# Patient Record
Sex: FEMALE | Race: WHITE | NOT HISPANIC OR LATINO | Employment: FULL TIME
[De-identification: names, ages, dates, MRNs, and addresses within clinical notes are randomized per-mention and may not be internally consistent; named-entity substitution may affect disease eponyms.]

---

## 2017-10-08 ENCOUNTER — HEALTH MAINTENANCE LETTER (OUTPATIENT)
Age: 42
End: 2017-10-08

## 2017-10-12 ENCOUNTER — TRANSFERRED RECORDS (OUTPATIENT)
Dept: HEALTH INFORMATION MANAGEMENT | Facility: CLINIC | Age: 42
End: 2017-10-12

## 2018-10-23 ENCOUNTER — RADIANT APPOINTMENT (OUTPATIENT)
Dept: MAMMOGRAPHY | Facility: CLINIC | Age: 43
End: 2018-10-23
Attending: OBSTETRICS & GYNECOLOGY
Payer: COMMERCIAL

## 2018-10-23 DIAGNOSIS — Z12.31 VISIT FOR SCREENING MAMMOGRAM: ICD-10-CM

## 2018-10-23 PROCEDURE — 77067 SCR MAMMO BI INCL CAD: CPT | Performed by: STUDENT IN AN ORGANIZED HEALTH CARE EDUCATION/TRAINING PROGRAM

## 2018-10-23 PROCEDURE — 77063 BREAST TOMOSYNTHESIS BI: CPT | Performed by: STUDENT IN AN ORGANIZED HEALTH CARE EDUCATION/TRAINING PROGRAM

## 2019-03-20 ENCOUNTER — THERAPY VISIT (OUTPATIENT)
Dept: PHYSICAL THERAPY | Facility: CLINIC | Age: 44
End: 2019-03-20
Payer: COMMERCIAL

## 2019-03-20 DIAGNOSIS — M25.511 ACUTE PAIN OF RIGHT SHOULDER: Primary | ICD-10-CM

## 2019-03-20 PROCEDURE — 97110 THERAPEUTIC EXERCISES: CPT | Mod: GP | Performed by: PHYSICAL THERAPIST

## 2019-03-20 PROCEDURE — 97112 NEUROMUSCULAR REEDUCATION: CPT | Mod: GP | Performed by: PHYSICAL THERAPIST

## 2019-03-20 PROCEDURE — 97161 PT EVAL LOW COMPLEX 20 MIN: CPT | Mod: GP | Performed by: PHYSICAL THERAPIST

## 2019-03-20 NOTE — LETTER
[facility]  [address]  [place] [address]  [phone]    2019    Re: Chanelle Rolle   :   1975  MRN:  [id number]   REFERRING PHYSICIAN:   [facility]    Date of Initial Evaluation:  3/20/19  Visits:  Rxs Used: 1  Reason for Referral:  Acute pain of right shoulder    [facility] Initial Evaluation    Subjective:  SPADI 13/100   The history is provided by the patient. No  was used.   Chanelle Rolle is a 44 year old female with a right shoulder condition.  Condition occurred with:  Lifting.  Condition occurred: during recreation/sport (weight lifting at her gym. ).  This is a new condition  March 3, 2019.    Patient reports pain:  In the joint, anterior and lateral.  Radiates to:  Upper arm.  Pain is described as burning and sharp and is intermittent and reported as 8/10.  Associated symptoms:  Painful arc. Pain is worse during the day.  Symptoms are exacerbated by lifting, using arm at shoulder level and using arm overhead and relieved by ice and rest.  Since onset symptoms are gradually improving.  Special tests:  X-ray.      General health as reported by patient is excellent.  Pertinent medical history includes:  None.  Medical allergies: no.  Other surgeries include:  None reported.  Current medications:  None as reported by the patient.  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  Barriers include:  None as reported by the patient.  Red flags:  None as reported by the patient.    Objective:  Standing Alignment:    Cervical/Thoracic:  Forward head  Shoulder/UE:  Scapular winging L, scapular winging R and rounded shoulders    Gait:    Gait Type:  Normal     Flexibility/Screens:   Negative screens: Cervical   Neurological: She is alert. She has  normal strength and normal reflexes. No cranial nerve deficit or sensory deficit.     Shoulder Evaluation:  ROM:  PROM:  : R forearm lacking Supination due to forearm fracture  as a  child   Flexion:  Left:  165    Right: 165    Internal Rotation:  Left:  90    Right:  90  External Rotation:  Left:  90    Right:  70    Stability Testing:  not assessed    Special Tests:    Right shoulder positive for the following special tests:Impingement    Assessment/Plan:    Patient is a 44 year old female with right side shoulder complaints.    Patient has the following significant findings with corresponding treatment plan.                Diagnosis 1:  Shoulder Impingement       Therapy Evaluation Codes:   1) History comprised of:   Personal factors that impact the plan of care:      None.    Comorbidity factors that impact the plan of care are:      None.     Medications impacting care: None.  2) Examination of Body Systems comprised of:   Body structures and functions that impact the plan of care:      Shoulder.   Activity limitations that impact the plan of care are:      Bathing, Dressing, Lifting and Working.  3) Clinical presentation characteristics are:   Stable/Uncomplicated.  4) Decision-Making    Low complexity using standardized patient assessment instrument and/or measureable assessment of functional outcome.    Cumulative Therapy Evaluation is: Low complexity.  Previous and current functional limitations:  (See Goal Flow Sheet for this information)    Short term and Long term goals: (See Goal Flow Sheet for this information)   Communication ability:  Patient appears to be able to clearly communicate and understand verbal and written communication and follow directions correctly.  Treatment Explanation - The following has been discussed with the patient:   RX ordered/plan of care  Anticipated outcomes  Possible risks and side effects  This patient would benefit from PT intervention to resume normal activities.    Rehab potential is good.    Frequency:  1 X week, to every other week once daily  Duration:  for  Up to 6  weeks  Discharge Plan:  Achieve all LTG.  Independent in home treatment program.  Reach maximal therapeutic benefit.    Rehab Plans: initiate light weight scap strength, posture management and hold off weight room lifting up to 4-6 weeks     Thank you for your referral.    INQUIRIES  Therapist: Jim Garcia, PT, ATC, Cert. MDT  [facility]  [address]  [place] [address]  Phone: [phone]  Fax: [phone]

## 2019-03-21 NOTE — PROGRESS NOTES
[facility] Initial Evaluation  Subjective:  SPADI 13/100       The history is provided by the patient. No  was used.   Chanelle Rolle is a 44 year old female with a right shoulder condition.  Condition occurred with:  Lifting.  Condition occurred: during recreation/sport (weight lifting at her gym. ).  This is a new condition  March 3, 2019.    Patient reports pain:  In the joint, anterior and lateral.  Radiates to:  Upper arm.  Pain is described as burning and sharp and is intermittent and reported as 8/10.  Associated symptoms:  Painful arc. Pain is worse during the day.  Symptoms are exacerbated by lifting, using arm at shoulder level and using arm overhead and relieved by ice and rest.  Since onset symptoms are gradually improving.  Special tests:  X-ray.      General health as reported by patient is excellent.  Pertinent medical history includes:  None.  Medical allergies: no.  Other surgeries include:  None reported.  Current medications:  None as reported by the patient.  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    Barriers include:  None as reported by the patient.    Red flags:  None as reported by the patient.                        Objective:  Standing Alignment:    Cervical/Thoracic:  Forward head  Shoulder/UE:  Scapular winging L, scapular winging R and rounded shoulders              Gait:    Gait Type:  Normal         Flexibility/Screens:   Negative screens: Cervical           Neurological: She is alert. She has normal strength and normal reflexes. No cranial nerve deficit or sensory deficit.                      Shoulder Evaluation:  ROM:    PROM:  : R forearm lacking Supination due to forearm fracture  as a  child   Flexion:  Left:  165    Right: 165          Internal Rotation:  Left:  90    Right:   90  External Rotation:  Left:  90    Right:  70                      Stability Testing:  not assessed      Special Tests:      Right shoulder positive for the following special tests:Impingement                                       General     ROS    Assessment/Plan:    Patient is a 44 year old female with right side shoulder complaints.    Patient has the following significant findings with corresponding treatment plan.                Diagnosis 1:  Shoulder Impingement       Therapy Evaluation Codes:   1) History comprised of:   Personal factors that impact the plan of care:      None.    Comorbidity factors that impact the plan of care are:      None.     Medications impacting care: None.  2) Examination of Body Systems comprised of:   Body structures and functions that impact the plan of care:      Shoulder.   Activity limitations that impact the plan of care are:      Bathing, Dressing, Lifting and Working.  3) Clinical presentation characteristics are:   Stable/Uncomplicated.  4) Decision-Making    Low complexity using standardized patient assessment instrument and/or measureable assessment of functional outcome.  Cumulative Therapy Evaluation is: Low complexity.    Previous and current functional limitations:  (See Goal Flow Sheet for this information)    Short term and Long term goals: (See Goal Flow Sheet for this information)     Communication ability:  Patient appears to be able to clearly communicate and understand verbal and written communication and follow directions correctly.  Treatment Explanation - The following has been discussed with the patient:   RX ordered/plan of care  Anticipated outcomes  Possible risks and side effects  This patient would benefit from PT intervention to resume normal activities.   Rehab potential is good.    Frequency:  1 X week, to every other week once daily  Duration:  for  Up to 6  weeks  Discharge Plan:  Achieve all LTG.  Independent in home treatment program.  Reach  maximal therapeutic benefit.    Rehab Plans: initiate light weight scap strength, posture management and hold off weight room lifting up to 4-6 weeks     Please refer to the daily flowsheet for treatment today, total treatment time and time spent performing 1:1 timed codes.

## 2019-04-01 ENCOUNTER — THERAPY VISIT (OUTPATIENT)
Dept: PHYSICAL THERAPY | Facility: CLINIC | Age: 44
End: 2019-04-01
Payer: COMMERCIAL

## 2019-04-01 DIAGNOSIS — M25.511 ACUTE PAIN OF RIGHT SHOULDER: Primary | ICD-10-CM

## 2019-04-01 PROCEDURE — 97110 THERAPEUTIC EXERCISES: CPT | Mod: GP | Performed by: PHYSICAL THERAPIST

## 2019-04-01 PROCEDURE — 97112 NEUROMUSCULAR REEDUCATION: CPT | Mod: GP | Performed by: PHYSICAL THERAPIST

## 2019-04-26 ENCOUNTER — THERAPY VISIT (OUTPATIENT)
Dept: PHYSICAL THERAPY | Facility: CLINIC | Age: 44
End: 2019-04-26
Payer: COMMERCIAL

## 2019-04-26 DIAGNOSIS — M75.41 IMPINGEMENT SYNDROME OF SHOULDER REGION, RIGHT: Primary | ICD-10-CM

## 2019-04-26 PROCEDURE — 97110 THERAPEUTIC EXERCISES: CPT | Mod: GP | Performed by: PHYSICAL THERAPIST

## 2019-04-26 PROCEDURE — 97112 NEUROMUSCULAR REEDUCATION: CPT | Mod: GP | Performed by: PHYSICAL THERAPIST

## 2019-04-26 NOTE — LETTER
[facility]   [address] [place] [id number] [phone]    2019    Re: Chanelle Rolle   :   1975  MRN:  [id number]   REFERRING PHYSICIAN:   [facility]    Date of Initial Evaluation:  3/20/19  Visits:  Rxs Used: 3  Reason for Referral:  Impingement syndrome of shoulder region, right    PROGRESS  REPORT  Progress reporting period is from 3/20/19 to 19. 3 visits     SUBJECTIVE  Overall improved pain and function. Pain with abduction position yet at roughly 90 degrees. Cannot sleep on R side at night.    Current Pain level: 2/10   Initial Pain level: 8/10   Changes in function: Yes, see goal flow sheet for change in function   Adverse reactions: None     OBJECTIVE  Objective: Abduction painful arc, ROM 90 IR, 90 ER, overhead flexion 170.   Weak and painful ER and abduction, lessened under traction.     Cross body stretching still hurts anterior shoulder, therefore DC. Emphasis to prone scap strength, RTC work ligt weight, high reps pain free 3-4x/week.   Follow up planned in 3 weeks. SPADI at that point.       ASSESSMENT/PLAN  Updated problem list and treatment plan: Diagnosis 1:  R shoulder impingement    Assessment of Progress: The patient's condition is improving.  The patient's condition has potential to improve.    Recommendations:  This patient would benefit from continued therapy.     Frequency:  1 X week, every 2-3 weeks once daily  Duration:  for 6 weeks    Thank you for your referral.    INQUIRIES  Therapist: Jim Garcia, PT, ATC, Cert. MDT  [facility]   [address] [place] [address]  Phone: [phone]  Fax: [phone]

## 2019-05-13 ENCOUNTER — THERAPY VISIT (OUTPATIENT)
Dept: PHYSICAL THERAPY | Facility: CLINIC | Age: 44
End: 2019-05-13
Payer: COMMERCIAL

## 2019-05-13 DIAGNOSIS — M25.511 ACUTE PAIN OF RIGHT SHOULDER: Primary | ICD-10-CM

## 2019-05-13 PROCEDURE — 97140 MANUAL THERAPY 1/> REGIONS: CPT | Mod: GP | Performed by: PHYSICAL THERAPIST

## 2019-05-13 PROCEDURE — 97112 NEUROMUSCULAR REEDUCATION: CPT | Mod: GP | Performed by: PHYSICAL THERAPIST

## 2019-05-13 PROCEDURE — 97110 THERAPEUTIC EXERCISES: CPT | Mod: GP | Performed by: PHYSICAL THERAPIST

## 2019-05-29 ENCOUNTER — PRE VISIT (OUTPATIENT)
Dept: ORTHOPEDICS | Facility: CLINIC | Age: 44
End: 2019-05-29

## 2019-05-29 DIAGNOSIS — M25.511 ACUTE PAIN OF RIGHT SHOULDER: Primary | ICD-10-CM

## 2019-05-30 ENCOUNTER — ANCILLARY PROCEDURE (OUTPATIENT)
Dept: GENERAL RADIOLOGY | Facility: CLINIC | Age: 44
End: 2019-05-30
Attending: ORTHOPAEDIC SURGERY
Payer: COMMERCIAL

## 2019-05-30 ENCOUNTER — OFFICE VISIT (OUTPATIENT)
Dept: ORTHOPEDICS | Facility: CLINIC | Age: 44
End: 2019-05-30
Payer: COMMERCIAL

## 2019-05-30 VITALS
HEIGHT: 72 IN | HEART RATE: 58 BPM | OXYGEN SATURATION: 100 % | SYSTOLIC BLOOD PRESSURE: 132 MMHG | WEIGHT: 137.5 LBS | DIASTOLIC BLOOD PRESSURE: 76 MMHG | BODY MASS INDEX: 18.62 KG/M2

## 2019-05-30 DIAGNOSIS — M67.919 DISORDER OF BURSAE AND TENDONS IN SHOULDER REGION: Primary | ICD-10-CM

## 2019-05-30 DIAGNOSIS — M25.511 ACUTE PAIN OF RIGHT SHOULDER: ICD-10-CM

## 2019-05-30 DIAGNOSIS — M71.9 DISORDER OF BURSAE AND TENDONS IN SHOULDER REGION: Primary | ICD-10-CM

## 2019-05-30 PROCEDURE — 73030 X-RAY EXAM OF SHOULDER: CPT | Mod: RT | Performed by: RADIOLOGY

## 2019-05-30 PROCEDURE — 99203 OFFICE O/P NEW LOW 30 MIN: CPT | Performed by: ORTHOPAEDIC SURGERY

## 2019-05-30 ASSESSMENT — MIFFLIN-ST. JEOR: SCORE: 1392.61

## 2019-05-30 NOTE — NURSING NOTE
"Chanelle Rolle's chief complaint for this visit includes:  Chief Complaint   Patient presents with     Consult For     Right shoulder pain- DOI 3/3/19     PCP: Amada Winters    Referring Provider:  Referred Self, MD  No address on file    /76   Pulse 58   Ht 1.848 m (6' 0.75\")   Wt 62.4 kg (137 lb 8 oz)   SpO2 100%   BMI 18.27 kg/m    Data Unavailable     Do you need any medication refills at today's visit? no      "

## 2019-05-30 NOTE — LETTER
5/30/2019         RE: Chanelle Rolle  [address]  [place] [address]        Dear Colleague,    Thank you for referring your patient, Chanelle Rolle, to the [facility]. Please see a copy of my visit note below.    Orthopedic Surgery Consult / History and Physical           Chief Concern:   Right shoulder pain           History of Present Illness:   This patient is a right hand dominant 44 year old female with no medical history who presents for evaluation of the above.    She reports that 2 months ago she was doing overhead tricep strengthening exercises when she felt something shift in her anterior right shoulder.  Resulted in significant pain for the first week, however she has been undergoing a course of physical therapy and since that time has noticed a significant improvement in the amount of pain she is experiencing.  With extended activity she does tend to have some anterior right shoulder pain still, however she is able to do most activities without issue at this point.  Her primary concern is to make sure that there is no structural problem going on, and therefore she wished to come in and talk to Dr. Queen today.       Symptom Profile  Location of symptoms:   Anterior right shoulder, roughly around the bicipital groove  Quality of symptoms:   achy  Severity:   Mild-moderate  Exacerbating:    Extended heavier activities  Alleviate:   Rest  Previous Treatments: Physical therapy              Past Medical History:   None  No past medical history on file.         Social History:   Smoking: None    Social History     Tobacco Use     Smoking status: Never Smoker   Substance Use Topics     Alcohol use: No            Family History:     Family History   Problem Relation Age of Onset     Hypertension Mother      Hypertension Father      Hypertension Maternal Grandmother             Allergies:     Allergies   Allergen Reactions     Amoxicillin Rash            Medications:    Anticoagulants:  none  Current Outpatient Medications   Medication     diclofenac (VOLTAREN) 1 % topical gel     No current facility-administered medications for this visit.               Physical Exam:   General: awake, alert, cooperative, no apparent distress, appears stated age  HEENT: normal  Respiratory: breathing non-labored  Cardiovascular: peripheral pulse palpable, capillary refill < 2sec  Skin: no rashes or lesions  Heme: No petechiae  Neurological: CN II-XII grossly intact  Musculoskeletal:     RUE       Skin c/d/i        SILT R/U/M/Ax       Motor:  FPL, EPL, abductors with 5/5 strength    5/5 strength in rotator cuff testing.  No pain with resisted rotator cuff evaluation.       Perfusion:  Warm and well perfused, palpable radial pulse   ROM: Full   Negative [place], negative Speed, negative Yergason             Data:   Imagin x-rays right shoulder demonstrate a normal right shoulder with no evidence of degenerative changes.             Assessment and Plan:   Assessment:  44-year-old female with possible right bicipital tendinopathy versus tendinitis, improving over time.     Plan:  1. Prescribed Voltaren gel.  2. If pain continuing in 4-6 weeks, patient may call in our clinic and we can obtain an MRI.  3. Follow-up as needed otherwise            Seen and examined with Dr. Queen, documentation by:    Larry Tejeda MD  Orthopaedic Surgery PGY-5     I have personally examined this patient and have reviewed the clinical presentation and progress note with the resident.  I agree with the treatment plan as outlined.  The plan was formulated with the resident on the day of the resident's note.       Again, thank you for allowing me to participate in the care of your patient.        Sincerely,        Tiff Queen MD

## 2019-05-30 NOTE — PROGRESS NOTES
Orthopedic Surgery Consult / History and Physical           Chief Concern:   Right shoulder pain           History of Present Illness:   This patient is a right hand dominant 44 year old female with no medical history who presents for evaluation of the above.    She reports that 2 months ago she was doing overhead tricep strengthening exercises when she felt something shift in her anterior right shoulder.  Resulted in significant pain for the first week, however she has been undergoing a course of physical therapy and since that time has noticed a significant improvement in the amount of pain she is experiencing.  With extended activity she does tend to have some anterior right shoulder pain still, however she is able to do most activities without issue at this point.  Her primary concern is to make sure that there is no structural problem going on, and therefore she wished to come in and talk to Dr. Queen today.       Symptom Profile  Location of symptoms:   Anterior right shoulder, roughly around the bicipital groove  Quality of symptoms:   achy  Severity:   Mild-moderate  Exacerbating:    Extended heavier activities  Alleviate:   Rest  Previous Treatments: Physical therapy              Past Medical History:   None  No past medical history on file.         Social History:   Smoking: None    Social History     Tobacco Use     Smoking status: Never Smoker   Substance Use Topics     Alcohol use: No            Family History:     Family History   Problem Relation Age of Onset     Hypertension Mother      Hypertension Father      Hypertension Maternal Grandmother             Allergies:     Allergies   Allergen Reactions     Amoxicillin Rash            Medications:   Anticoagulants:  none  Current Outpatient Medications   Medication     diclofenac (VOLTAREN) 1 % topical gel     No current facility-administered medications for this visit.               Physical Exam:   General: awake, alert, cooperative, no apparent  distress, appears stated age  HEENT: normal  Respiratory: breathing non-labored  Cardiovascular: peripheral pulse palpable, capillary refill < 2sec  Skin: no rashes or lesions  Heme: No petechiae  Neurological: CN II-XII grossly intact  Musculoskeletal:     RUE       Skin c/d/i        SILT R/U/M/Ax       Motor:  FPL, EPL, abductors with 5/5 strength    5/5 strength in rotator cuff testing.  No pain with resisted rotator cuff evaluation.       Perfusion:  Warm and well perfused, palpable radial pulse   ROM: Full   Negative [place], negative Speed, negative Yergason             Data:   Imagin x-rays right shoulder demonstrate a normal right shoulder with no evidence of degenerative changes.             Assessment and Plan:   Assessment:  44-year-old female with possible right bicipital tendinopathy versus tendinitis, improving over time.     Plan:  1. Prescribed Voltaren gel.  2. If pain continuing in 4-6 weeks, patient may call in our clinic and we can obtain an MRI.  3. Follow-up as needed otherwise            Seen and examined with Dr. Queen, documentation by:    Larry Tejeda MD  Orthopaedic Surgery PGY-5     I have personally examined this patient and have reviewed the clinical presentation and progress note with the resident.  I agree with the treatment plan as outlined.  The plan was formulated with the resident on the day of the resident's note.

## 2019-05-30 NOTE — PATIENT INSTRUCTIONS
Thanks for coming today.  Or[facility] Clinic  [address] [place] [address]    To schedule future appointments in Ortho Clinic, you may call [phone].    To schedule ordered imaging by your Provider: Call [facility] at [phone]    [employer] available online at:   [email or web address]    Please call if any further questions or concerns [phone] and ask for the Orthopedic Department. Clinic hours 8 am to 5 pm.    Return to clinic if symptoms worsen.

## 2019-06-05 ENCOUNTER — THERAPY VISIT (OUTPATIENT)
Dept: PHYSICAL THERAPY | Facility: CLINIC | Age: 44
End: 2019-06-05
Payer: COMMERCIAL

## 2019-06-05 DIAGNOSIS — M25.511 ACUTE PAIN OF RIGHT SHOULDER: Primary | ICD-10-CM

## 2019-06-05 PROCEDURE — 97112 NEUROMUSCULAR REEDUCATION: CPT | Mod: GP | Performed by: PHYSICAL THERAPIST

## 2019-06-05 PROCEDURE — 97110 THERAPEUTIC EXERCISES: CPT | Mod: GP | Performed by: PHYSICAL THERAPIST

## 2019-06-28 ENCOUNTER — THERAPY VISIT (OUTPATIENT)
Dept: PHYSICAL THERAPY | Facility: CLINIC | Age: 44
End: 2019-06-28
Payer: COMMERCIAL

## 2019-06-28 PROCEDURE — 97112 NEUROMUSCULAR REEDUCATION: CPT | Mod: GP | Performed by: PHYSICAL THERAPIST

## 2019-06-28 PROCEDURE — 97110 THERAPEUTIC EXERCISES: CPT | Mod: GP | Performed by: PHYSICAL THERAPIST

## 2019-06-28 NOTE — LETTER
[facility]  [address]  [place] [address]  [phone]    2019    Re: Chanelle Rolle   :   1975  MRN:  [id number]   REFERRING PHYSICIAN:   [facility]  Date of Initial Evaluation: 3/20/2019  Visits:  Rxs Used: 6  Reason for Referral:  Acute pain of right shoulder    EVALUATION SUMMARY  Assessment/Plan:    PROGRESS  REPORT    Progress reporting period is from 3/20/2019 to 2019. 6 visits.    SUBJECTIVE  Chanelle is 12 weeks post injury right shoulder.      She had been directed to active range of motion, stretching, and strengthening by her physician at the initial consultation on 3/9/2019..      Feels most recovery has been noted over the past month with primary emphasis to scapular strengthening, posture, and significant modification to her weight lifting program at the gym.      Today, she happily returning the physical therapy clinic noting that she is roughly 90% improved overall pain and function.      She is weak and painful with abduction type positions at  shoulder height and directly overhead.  She avoids heavy lifting and quick movement type activity as well..       Initial Pain level: 8/10   Changes in function: No changes noted in function since last SOAP note   Adverse reactions: None;   ,         OBJECTIVE  Patient attempts  abduction position, she does note a painful arc roughly 90 degrees.      Overall range of motion is in good shape with roughly 85 degrees external rotation, 90 degrees internal rotation, and 160 degrees of overhead reach.    Re: Chanelle Rolle   :   1975    Chanelle enjoys to work out, stay healthy and cyclic has not been able to do for a few weeks now.      She also credits her recovery time to using analgesiic/topical Voltaren and seems to have helped with alleviating some of her shoulder discomfort..      In note of interest, she presents with bilateral shoulder scapular dyskinesis, back left appears  to be more of a dysfunction but is painless.      Right shoulder presents with a depressed right shoulder position at rest.  She is able to read a painful arc during abduction position with physical therapist assist to manually depressive scapula while she elevates her arm.      It seems clear at this point to continue on with scapular strengthening, pectoralis stretching, thoracic spine extension and holding off heavy weight or overhead exercise.      ASSESSMENT/PLAN  Updated problem list and treatment plan: Diagnosis 1: Right rotator cuff tendinitis, scapular dyskinesis  STG/LTGs have been met or progress has been made towards goals:  Yes (See Goal flow sheet completed today.)  Assessment of Progress: The patient's condition is improving.  The patient's condition has potential to improve.  Self Management Plans:        Recommendations:  This patient would benefit from continued therapy.     Frequency:  1 X a month, once daily  Duration:  for 1 months    Chanelle is at scheduled for another appointment at this point.    Consider discharge from physical therapy as planned to be to follow-up by telephone, she has an episode or flare would require modification to her program into the physical therapy clinic.    This patient would benefit from further evaluation.    Thank you for your referral.    INQUIRIES  Therapist: Jules Garcia, PT, ATC  [facility]  [address]  [place] [address]  Phone: [phone]  Fax: [phone]

## 2019-06-30 NOTE — PROGRESS NOTES
Subjective:  HPI                    Objective:  System    Physical Exam    General     ROS    Assessment/Plan:    PROGRESS  REPORT    Progress reporting period is from 3/20/2019 to 6/28/2019. 6 visits.    SUBJECTIVE  Chanelle is 12 weeks post injury right shoulder.      She had been directed to active range of motion, stretching, and strengthening by her physician at the initial consultation on 3/9/2019..      Feels most recovery has been noted over the past month with primary emphasis to scapular strengthening, posture, and significant modification to her weight lifting program at the gym.      Today, she happily returning the physical therapy clinic noting that she is roughly 90% improved overall pain and function.      She is weak and painful with abduction type positions at  shoulder height and directly overhead.  She avoids heavy lifting and quick movement type activity as well..       Initial Pain level: 8/10   Changes in function: No changes noted in function since last SOAP note   Adverse reactions: None;   ,         OBJECTIVE  Patient attempts  abduction position, she does note a painful arc roughly 90 degrees.      Overall range of motion is in good shape with roughly 85 degrees external rotation, 90 degrees internal rotation, and 160 degrees of overhead reach.      Chanelle enjoys to work out, stay healthy and cyclic has not been able to do for a few weeks now.      She also credits her recovery time to using analgesiic/topical Voltaren and seems to have helped with alleviating some of her shoulder discomfort..      In note of interest, she presents with bilateral shoulder scapular dyskinesis, back left appears to be more of a dysfunction but is painless.      Right shoulder presents with a depressed right shoulder position at rest.  She is able to read a painful arc during abduction position with physical therapist assist to manually depressive scapula while she elevates her arm.      It seems clear at this  point to continue on with scapular strengthening, pectoralis stretching, thoracic spine extension and holding off heavy weight or overhead exercise.      ASSESSMENT/PLAN  Updated problem list and treatment plan: Diagnosis 1: Right rotator cuff tendinitis, scapular dyskinesis  STG/LTGs have been met or progress has been made towards goals:  Yes (See Goal flow sheet completed today.)  Assessment of Progress: The patient's condition is improving.  The patient's condition has potential to improve.  Self Management Plans:        Recommendations:  This patient would benefit from continued therapy.     Frequency:  1 X a month, once daily  Duration:  for 1 months    Chanelle is at scheduled for another appointment at this point.    Consider discharge from physical therapy as planned to be to follow-up by telephone, she has an episode or flare would require modification to her program into the physical therapy clinic.    This patient would benefit from further evaluation.    Please refer to the daily flowsheet for treatment today, total treatment time and time spent performing 1:1 timed codes.

## 2019-09-11 ENCOUNTER — MYC REFILL (OUTPATIENT)
Dept: ORTHOPEDICS | Facility: CLINIC | Age: 44
End: 2019-09-11

## 2019-09-11 DIAGNOSIS — M67.919 DISORDER OF BURSAE AND TENDONS IN SHOULDER REGION: ICD-10-CM

## 2019-09-11 DIAGNOSIS — M71.9 DISORDER OF BURSAE AND TENDONS IN SHOULDER REGION: ICD-10-CM

## 2019-10-28 ENCOUNTER — ANCILLARY PROCEDURE (OUTPATIENT)
Dept: MAMMOGRAPHY | Facility: CLINIC | Age: 44
End: 2019-10-28
Attending: OBSTETRICS & GYNECOLOGY
Payer: COMMERCIAL

## 2019-10-28 DIAGNOSIS — Z12.31 SCREENING MAMMOGRAM, ENCOUNTER FOR: ICD-10-CM

## 2019-10-28 PROCEDURE — 77067 SCR MAMMO BI INCL CAD: CPT | Performed by: RADIOLOGY

## 2019-10-28 PROCEDURE — 77063 BREAST TOMOSYNTHESIS BI: CPT | Performed by: RADIOLOGY

## 2020-02-23 ENCOUNTER — HEALTH MAINTENANCE LETTER (OUTPATIENT)
Age: 45
End: 2020-02-23

## 2020-12-11 ENCOUNTER — ANCILLARY PROCEDURE (OUTPATIENT)
Dept: MAMMOGRAPHY | Facility: CLINIC | Age: 45
End: 2020-12-11
Attending: OBSTETRICS & GYNECOLOGY
Payer: COMMERCIAL

## 2020-12-11 DIAGNOSIS — Z12.31 VISIT FOR SCREENING MAMMOGRAM: ICD-10-CM

## 2020-12-11 PROCEDURE — 77063 BREAST TOMOSYNTHESIS BI: CPT

## 2020-12-11 PROCEDURE — 77067 SCR MAMMO BI INCL CAD: CPT

## 2020-12-13 ENCOUNTER — HEALTH MAINTENANCE LETTER (OUTPATIENT)
Age: 45
End: 2020-12-13

## 2021-04-17 ENCOUNTER — HEALTH MAINTENANCE LETTER (OUTPATIENT)
Age: 46
End: 2021-04-17

## 2021-09-26 ENCOUNTER — HEALTH MAINTENANCE LETTER (OUTPATIENT)
Age: 46
End: 2021-09-26

## 2022-01-18 ENCOUNTER — ANCILLARY PROCEDURE (OUTPATIENT)
Dept: MAMMOGRAPHY | Facility: CLINIC | Age: 47
End: 2022-01-18
Attending: OBSTETRICS & GYNECOLOGY
Payer: COMMERCIAL

## 2022-01-18 DIAGNOSIS — Z12.31 VISIT FOR SCREENING MAMMOGRAM: ICD-10-CM

## 2022-01-18 PROCEDURE — 77063 BREAST TOMOSYNTHESIS BI: CPT | Mod: GC | Performed by: RADIOLOGY

## 2022-01-18 PROCEDURE — 77067 SCR MAMMO BI INCL CAD: CPT | Mod: GC | Performed by: RADIOLOGY

## 2022-05-08 ENCOUNTER — HEALTH MAINTENANCE LETTER (OUTPATIENT)
Age: 47
End: 2022-05-08

## 2023-01-08 ENCOUNTER — HEALTH MAINTENANCE LETTER (OUTPATIENT)
Age: 48
End: 2023-01-08

## 2023-01-20 ENCOUNTER — ANCILLARY PROCEDURE (OUTPATIENT)
Dept: MAMMOGRAPHY | Facility: CLINIC | Age: 48
End: 2023-01-20
Attending: OBSTETRICS & GYNECOLOGY
Payer: COMMERCIAL

## 2023-01-20 DIAGNOSIS — Z12.31 SCREENING MAMMOGRAM FOR HIGH-RISK PATIENT: ICD-10-CM

## 2023-01-20 PROCEDURE — 77063 BREAST TOMOSYNTHESIS BI: CPT | Mod: GC | Performed by: RADIOLOGY

## 2023-01-20 PROCEDURE — 77067 SCR MAMMO BI INCL CAD: CPT | Mod: GC | Performed by: RADIOLOGY

## 2023-06-02 ENCOUNTER — HEALTH MAINTENANCE LETTER (OUTPATIENT)
Age: 48
End: 2023-06-02

## 2024-02-02 ENCOUNTER — ANCILLARY ORDERS (OUTPATIENT)
Dept: MAMMOGRAPHY | Facility: CLINIC | Age: 49
End: 2024-02-02

## 2024-02-02 DIAGNOSIS — Z12.31 VISIT FOR SCREENING MAMMOGRAM: Primary | ICD-10-CM

## 2024-02-15 ENCOUNTER — ANCILLARY PROCEDURE (OUTPATIENT)
Dept: MAMMOGRAPHY | Facility: CLINIC | Age: 49
End: 2024-02-15
Payer: COMMERCIAL

## 2024-02-15 DIAGNOSIS — Z12.31 VISIT FOR SCREENING MAMMOGRAM: ICD-10-CM

## 2024-02-15 PROCEDURE — 77063 BREAST TOMOSYNTHESIS BI: CPT | Performed by: RADIOLOGY

## 2024-02-15 PROCEDURE — 77067 SCR MAMMO BI INCL CAD: CPT | Performed by: RADIOLOGY

## 2024-06-30 ENCOUNTER — HEALTH MAINTENANCE LETTER (OUTPATIENT)
Age: 49
End: 2024-06-30

## 2025-01-29 ENCOUNTER — ANCILLARY ORDERS (OUTPATIENT)
Dept: OBGYN | Facility: CLINIC | Age: 50
End: 2025-01-29

## 2025-01-29 DIAGNOSIS — Z12.31 VISIT FOR SCREENING MAMMOGRAM: Primary | ICD-10-CM

## 2025-07-13 ENCOUNTER — HEALTH MAINTENANCE LETTER (OUTPATIENT)
Age: 50
End: 2025-07-13